# Patient Record
Sex: FEMALE | Race: WHITE | URBAN - METROPOLITAN AREA
[De-identification: names, ages, dates, MRNs, and addresses within clinical notes are randomized per-mention and may not be internally consistent; named-entity substitution may affect disease eponyms.]

---

## 2017-04-25 ENCOUNTER — TRANSFERRED RECORDS (OUTPATIENT)
Dept: HEALTH INFORMATION MANAGEMENT | Facility: CLINIC | Age: 27
End: 2017-04-25

## 2017-05-22 ENCOUNTER — TRANSFERRED RECORDS (OUTPATIENT)
Dept: HEALTH INFORMATION MANAGEMENT | Facility: CLINIC | Age: 27
End: 2017-05-22

## 2017-05-22 LAB
ALBUMIN SERPL-MCNC: 4.5 G/DL (ref 3.5–5.5)
ALP SERPL-CCNC: 76 IU/L (ref 39–117)
ALT SERPL-CCNC: 23 IU/L (ref 0–32)
AST SERPL-CCNC: 26 IU/L (ref 0–40)
BILIRUB SERPL-MCNC: <0.2 MG/DL (ref 0–1.2)
BUN SERPL-MCNC: 11 MG/DL (ref 6–20)
CALCIUM SERPL-MCNC: 9.5 MG/DL (ref 8.7–10.2)
CHLORIDE SERPLBLD-SCNC: 106 MMOL/L (ref 96–106)
CO2 SERPL-SCNC: 18 MMOL/L (ref 18–28)
CREAT SERPL-MCNC: 0.56 MG/DL (ref 0.57–1)
GFR SERPL CREATININE-BSD FRML MDRD: 128 ML/MIN/1.73M2
GLUCOSE SERPL-MCNC: 80 MG/DL (ref 65–99)
POTASSIUM SERPL-SCNC: 3.9 MMOL/L (ref 3.5–5.2)
PROT SERPL-MCNC: 7.2 G/DL (ref 6–8.5)
SODIUM SERPL-SCNC: 142 MMOL/L (ref 134–144)

## 2017-07-20 ENCOUNTER — OFFICE VISIT (OUTPATIENT)
Dept: INTERNAL MEDICINE | Facility: CLINIC | Age: 27
End: 2017-07-20
Payer: MEDICAID

## 2017-07-20 VITALS
TEMPERATURE: 99 F | WEIGHT: 85 LBS | OXYGEN SATURATION: 99 % | HEART RATE: 96 BPM | DIASTOLIC BLOOD PRESSURE: 70 MMHG | SYSTOLIC BLOOD PRESSURE: 110 MMHG | BODY MASS INDEX: 17.17 KG/M2

## 2017-07-20 DIAGNOSIS — J30.9 ALLERGIC RHINITIS, UNSPECIFIED CHRONICITY, UNSPECIFIED SEASONALITY, UNSPECIFIED TRIGGER: ICD-10-CM

## 2017-07-20 DIAGNOSIS — F84.2 RETT'S SYNDROME: ICD-10-CM

## 2017-07-20 DIAGNOSIS — R56.9 CONVULSIONS, UNSPECIFIED CONVULSION TYPE (H): ICD-10-CM

## 2017-07-20 DIAGNOSIS — Z15.1 RETT'S SYNDROME: ICD-10-CM

## 2017-07-20 DIAGNOSIS — H57.9 EYE DISORDER: ICD-10-CM

## 2017-07-20 DIAGNOSIS — Z00.01 ENCOUNTER FOR GENERAL ADULT MEDICAL EXAMINATION WITH ABNORMAL FINDINGS: Primary | ICD-10-CM

## 2017-07-20 PROCEDURE — 99395 PREV VISIT EST AGE 18-39: CPT | Performed by: INTERNAL MEDICINE

## 2017-07-20 RX ORDER — OLOPATADINE HYDROCHLORIDE 1 MG/ML
1 SOLUTION/ DROPS OPHTHALMIC 2 TIMES DAILY
Qty: 10 ML | Refills: 11 | Status: SHIPPED | OUTPATIENT
Start: 2017-07-20

## 2017-07-20 RX ORDER — DIAZEPAM ORAL SOLUTION (CONCENTRATE) 5 MG/ML
SOLUTION ORAL
COMMUNITY
Start: 2017-07-20

## 2017-07-20 RX ORDER — LEVOCARNITINE 1 G/10ML
SOLUTION ORAL
COMMUNITY
Start: 2017-07-20

## 2017-07-20 NOTE — PROGRESS NOTES
SUBJECTIVE:   CC: Kristi Cramer is an 26 year old woman who presents for preventive health visit.     Healthy Habits:    Do you get at least three servings of calcium containing foods daily (dairy, green leafy vegetables, etc.)? yes    Amount of exercise or daily activities, outside of work: N/A    Problems taking medications regularly No    Medication side effects: No    Have you had an eye exam in the past two years? NO    Do you see a dentist twice per year? yes    Do you have sleep apnea, excessive snoring or daytime drowsiness?no        Today's PHQ-2 Score: PHQ-2 ( 1999 Pfizer) 7/20/2017 7/19/2016   Q1: Little interest or pleasure in doing things 0 0   Q2: Feeling down, depressed or hopeless 0 0   PHQ-2 Score 0 0   Q1: Little interest or pleasure in doing things - -   Q2: Feeling down, depressed or hopeless - -   PHQ-2 Score - -         Abuse: Current or Past(Physical, Sexual or Emotional)- No  Do you feel safe in your environment - Yes  Social History   Substance Use Topics     Smoking status: Never Smoker     Smokeless tobacco: Not on file     Alcohol use No     N/A Patient does not drink alcohol    Reviewed orders with patient.  Reviewed health maintenance and updated orders accordingly - Yes  Labs reviewed in EPIC    Mammogram not appropriate for this patient based on age.    Pertinent mammograms are reviewed under the imaging tab.  History of abnormal Pap smear:  No. Not sexually active and not able to do pap/pelvic due to pt cooperation. Would have to be done under full sedation    Reviewed and updated as needed this visit by clinical staff  Tobacco  Allergies         Reviewed and updated as needed this visit by Provider            ROS:  Full review of systems unable to be obtained for the patient both due to her baseline neurologic functional status and also because of patient's current sedation. Patient had a seizure prior to leaving her home with her mother to come to the appointment today.  Patient was treated with Valium as per standard recommendations of her neurologist. Per the patient's mother, patient has seizures once or twice a week which her neurologist is aware of. No acute increase in frequency. These are brief and either did not require additional treatment or respond to Valium single doses prescribed. Mother states the patient's appetite has been stable. She has not been having any fevers or chills recently. No coughing or appearance of shortness of breath. Bowel movements of an regular. Urine frequency is at baseline. No rashes. Patient's mood has appeared happy. Has occ eye erythema and itchng without mattering that responds generally to Patanol drops. Pt doesnt like to swallow pills and has occ sneezing too or clear rhinorrhea and other requests trial of antihistamine. Menses regular    OBJECTIVE:   /70  Pulse 96  Temp 99  F (37.2  C) (Axillary)  Wt 85 lb (38.6 kg)  LMP 07/17/2017 (Exact Date)  SpO2 99%  BMI 17.17 kg/m2  EXAM:  Gen: Sitting in WC. Gait not assessed  Eye: PERRL, EOMI. no current erythema  HENT: ear canals and TM's normal and nose and mouth without ulcers or lesions. Nasal mucosa minimally edematous with some clear nasal discharge. Sinuses nontender   Neck: no adenopathy. Thyroid normal to palpation. No bruits  Pulm: Lungs clear to auscultation   CV: Regular rates and rhythm  GI: Soft, nontender, Normal active bowel sounds, No hepatosplenomegaly or masses palpable  Ext: Peripheral pulses intact. No edema.  Neuro: Normal strength and tone, sensory exam grossly normal to LTS. Pt appears mildly sleepy compared to baseline consistent with recent valium dose      ASSESSMENT/PLAN:   1. Encounter for general adult medical examination with abnormal findings  HCM UTD except pap/pelvic which am not able to do unless under sedation. 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 Will there defer unless sx/findings  "require.  Menses regular per pt's mother. Recent CMP, CBC labs from Neurology normal. No other screening labs needed    2. Eye disorder  Continue eyedrops p.r.n. Currently asymptomatic  - olopatadine (PATANOL) 0.1 % ophthalmic solution; Apply 1 drop to eye 2 times daily as needed for eye irritation  Dispense: 10 mL; Refill: 11    3. Allergic rhinitis, unspecified chronicity, unspecified seasonality, unspecified trigger  Trial of antihistamine therapy as needed for seasonal allergy symptoms  - loratadine (LORATADINE CHILDRENS) 5 MG/5ML syrup; Take 10 mLs (10 mg) by mouth daily  Dispense: 300 mL; Refill: 11    4. Convulsions, unspecified convulsion type (H)  Patient followed by neurology. Recent seizure med levels normal. Continue management per neurology. They are aware of frequency of seizures per pt's mother    5. Rett's syndrome    COUNSELING:   Reviewed preventive health counseling, as reflected in patient instructions         reports that she has never smoked. She does not have any smokeless tobacco history on file.    Estimated body mass index is 17.17 kg/(m^2) as calculated from the following:    Height as of 7/19/16: 4' 11\" (1.499 m).    Weight as of this encounter: 85 lb (38.6 kg).   Weight management plan noted, stable and monitoring    Counseling Resources:  ATP IV Guidelines  Pooled Cohorts Equation Calculator  Breast Cancer Risk Calculator  FRAX Risk Assessment  ICSI Preventive Guidelines  Dietary Guidelines for Americans, 2010  USDA's MyPlate  ASA Prophylaxis  Lung CA Screening    Louis Dale MD  [facility]  "

## 2017-07-20 NOTE — NURSING NOTE
"Chief Complaint   Patient presents with     Physical       Initial /70  Pulse 96  Temp 99  F (37.2  C) (Axillary)  Wt 85 lb (38.6 kg)  LMP 07/17/2017 (Exact Date)  SpO2 99%  BMI 17.17 kg/m2 Estimated body mass index is 17.17 kg/(m^2) as calculated from the following:    Height as of 7/19/16: 4' 11\" (1.499 m).    Weight as of this encounter: 85 lb (38.6 kg).  Medication Reconciliation: complete.  "

## 2017-07-20 NOTE — MR AVS SNAPSHOT
"              After Visit Summary   7/20/2017    Kristi Cramer    MRN: [id number]           Patient Information     Date Of Birth          1990        Visit Information        Provider Department      7/20/2017 11:30 AM Louis Dale MD [facility]        Today's Diagnoses     Encounter for general adult medical examination with abnormal findings    -  1    Eye disorder        Allergic rhinitis, unspecified chronicity, unspecified seasonality, unspecified trigger        Convulsions, unspecified convulsion type (H)        Rett's syndrome           Follow-ups after your visit        Who to contact     If you have questions or need follow up information about today's clinic visit or your schedule please contact [facility] directly at [phone].  Normal or non-critical lab and imaging results will be communicated to you by MyChart, letter or phone within 4 business days after the clinic has received the results. If you do not hear from us within 7 days, please contact the clinic through MyChart or phone. If you have a critical or abnormal lab result, we will notify you by phone as soon as possible.  Submit refill requests through [employer] or call your pharmacy and they will forward the refill request to us. Please allow 3 business days for your refill to be completed.          Additional Information About Your Visit        [facility] Information     [facility]BEP lets you send messages to your doctor, view your test results, renew your prescriptions, schedule appointments and more. To sign up, go to [email or web address] . Click on \"Log in\" on the left side of the screen, which will take you to the Welcome page. Then click on \"Sign up Now\" on the right side of the page.     You will be asked to enter the access code listed below, as well as some personal information. Please follow the directions to create your username and password.     Your access code is: " [email or web address]  Expires: 10/21/2017  6:05 PM     Your access code will  in 90 days. If you need help or a new code, please call your [place] clinic or [phone].        Care EveryWhere ID     This is your Care EveryWhere ID. This could be used by other organizations to access your [place] medical records  [id number]        Your Vitals Were     Pulse Temperature Last Period Pulse Oximetry BMI (Body Mass Index)       96 99  F (37.2  C) (Axillary) 2017 (Exact Date) 99% 17.17 kg/m2        Blood Pressure from Last 3 Encounters:   17 110/70   16 112/76   14 100/62    Weight from Last 3 Encounters:   17 85 lb (38.6 kg)   16 89 lb (40.4 kg)   06/29/15 80 lb 6.4 oz (36.5 kg)              Today, you had the following     No orders found for display         Today's Medication Changes          These changes are accurate as of: 17 11:59 PM.  If you have any questions, ask your nurse or doctor.               Start taking these medicines.        Dose/Directions    loratadine 5 MG/5ML syrup   Commonly known as:  LORATADINE CHILDRENS   Used for:  Allergic rhinitis, unspecified chronicity, unspecified seasonality, unspecified trigger   Started by:  Louis Dale MD        Dose:  10 mg   Take 10 mLs (10 mg) by mouth daily   Quantity:  300 mL   Refills:  11         These medicines have changed or have updated prescriptions.        Dose/Directions    CARNITOR 1 GM/10ML solution   This may have changed:  additional instructions   Generic drug:  levOCARNitine   Changed by:  Louis Dale MD        5ml three times a day   Refills:  0       DIAZEPAM INTENSOL 5 MG/ML (HIGH CONC) solution   This may have changed:  See the new instructions.   Generic drug:  diazepam   Changed by:  Louis Dale MD        4 cc with sz activity   Refills:  0       olopatadine 0.1 % ophthalmic solution   Commonly known as:  PATANOL   This may have changed:  how much to take   Used for:  Eye disorder   Changed by:   Louis Dale MD        Dose:  1 drop   Apply 1 drop to eye 2 times daily as needed for eye irritation   Quantity:  10 mL   Refills:  11            Where to get your medicines      These medications were sent to [facility] [id number] [place] [address] - [address] AT [facility]  [address], [facility] [address]    Hours:  24-hours Phone:  [phone]     loratadine 5 MG/5ML syrup    olopatadine 0.1 % ophthalmic solution                Primary Care Provider Office Phone # Fax #    Louis Dale [phone]       [facility] [address]  [facility] [address]        Equal Access to Services     JESUS TOMAS AH: Hadii rik ku hadasho Soomaali, waaxda luqadaha, qaybta kaalmada adeegyada, waxay idiin haytaiwo ambrose. So [facility] [phone].    ATENCIÓN: Si habla español, tiene a buchanan disposición servicios gratuitos de asistencia lingüística. [facility] [phone].    We comply with applicable federal civil rights laws and [place] laws. We do not discriminate on the basis of race, color, national origin, age, disability sex, sexual orientation or gender identity.            Thank you!     Thank you for choosing [facility]  for your care. Our goal is always to provide you with excellent care. Hearing back from our patients is one way we can continue to improve our services. Please take a few minutes to complete the written survey that you may receive in the mail after your visit with us. Thank you!             Your Updated Medication List - Protect others around you: Learn how to safely use, store and throw away your medicines at www.disposemymeds.org.          This list is accurate as of: 7/20/17 11:59 PM.  Always use your most recent med list.                   Brand Name Dispense Instructions for use Diagnosis    CALCIUM GUMMIES 250-100-500 MG-MG-UNIT Chew   Generic drug:  Calcium-Phosphorus-Vitamin D      Take 1 chew tab by  mouth daily        CARNITOR 1 GM/10ML solution   Generic drug:  levOCARNitine      5ml three times a day        DIAZEPAM INTENSOL 5 MG/ML (HIGH CONC) solution   Generic drug:  diazepam      4 cc with sz activity        laMICtal 25 MG tablet   Generic drug:  lamoTRIgine      Take  by mouth. 4 tablets in the am and 4 tabletsin the pm daily        loratadine 5 MG/5ML syrup    LORATADINE CHILDRENS    300 mL    Take 10 mLs (10 mg) by mouth daily    Allergic rhinitis, unspecified chronicity, unspecified seasonality, unspecified trigger       MIRALAX powder   Generic drug:  polyethylene glycol     1 Bottle    (1/2 capful) in 4 ounces of liquid as needed        Multi-vitamin Tabs tablet   Generic drug:  multivitamin, therapeutic with minerals     30    1 TABLET DAILY        olopatadine 0.1 % ophthalmic solution    PATANOL    10 mL    Apply 1 drop to eye 2 times daily as needed for eye irritation    Eye disorder       order for DME     150 each    5 diapers needed per day    Urinary incontinence, unspecified type       valproic acid 250 MG/5ML Syrp    DEPAKENE     TK 7.5ML PO BID        ZONEGRAN 100 MG capsule   Generic drug:  zonisamide      1 capsul in the am and 2 capsules in the pm daily

## 2017-09-14 ENCOUNTER — TELEPHONE (OUTPATIENT)
Dept: INTERNAL MEDICINE | Facility: CLINIC | Age: 27
End: 2017-09-14

## 2017-09-14 NOTE — TELEPHONE ENCOUNTER
Patient's mother Nat will be turning in a proxy form to renew proxy to her MyChart. For adults, we typically require the patient to be able to sign the form in order to complete the proxy. Mother states patient is unable to sign and has been able to access information in the past without a signature. I've not been able to locate a POA or legal guardianship on file to which mother states she's turned in previously. If the clinic does have information, please update and renew MyChart Proxy.    Central Scheduling  Anthony LÓPEZ

## 2017-09-18 NOTE — TELEPHONE ENCOUNTER
This is a previous Mychart  Proxy from 12/28/11 in media tab. Cannot find a POA document in chart. Demographics says there is a custody documentation abstracted in chart somewhere but that may have been when pt was  Not an adult yet. Would recommend that mother mail us POA/legal guardianship paperwork if needed to get on file. 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

## 2017-09-22 NOTE — TELEPHONE ENCOUNTER
Spoke with patients mother. She states she has previously had access and is not understanding what is going on. When I looked into the patient chart, the mother signed and a HIM CELINE Authorization form to gain proxy access to her daughter chart on 10/3/2012. Does mom simply need to resign this same form?

## 2017-09-28 ENCOUNTER — TELEPHONE (OUTPATIENT)
Dept: INTERNAL MEDICINE | Facility: CLINIC | Age: 27
End: 2017-09-28

## 2017-09-28 NOTE — TELEPHONE ENCOUNTER
Form faxed to [facility] @ [phone].

## 2017-09-28 NOTE — TELEPHONE ENCOUNTER
Rcvd call from Ene Daniel at [facility] looking for MD to sign forms that were sent over  Pulled information from MD's folder and it was given to him and he has been advised this is needed today  Spoke with Ene and advised of the information above  She will call back if not received before end of day  Ene Daniel can be reached at [phone]

## 2017-09-28 NOTE — TELEPHONE ENCOUNTER
Mother requested that I update form so that signed again within 30 days of Kristi's first stay at [facility] and to update that it was OK to break open Zonisamide capsules and  Give in liquid since pt has trouble swallowing capsules.  Form resigned with those changes. Please refax form (fax number on top of form). Form in [place] basket

## 2017-09-29 NOTE — TELEPHONE ENCOUNTER
This is not something the physican needs to be addressing. If there is anything else that is needed for pt's mother  Who is POA to  Access the pt's chart, then nursing or the  staff should be able to take care of this

## 2017-10-03 ENCOUNTER — TELEPHONE (OUTPATIENT)
Dept: INTERNAL MEDICINE | Facility: CLINIC | Age: 27
End: 2017-10-03

## 2017-10-03 DIAGNOSIS — R32 URINARY INCONTINENCE, UNSPECIFIED TYPE: ICD-10-CM

## 2017-10-03 NOTE — TELEPHONE ENCOUNTER
DME: Curly pull-ups include pts' weight and non-sterile gloves; fax to [facility] FAX:[phone] attn: Toya

## 2017-10-11 ENCOUNTER — TELEPHONE (OUTPATIENT)
Dept: INTERNAL MEDICINE | Facility: CLINIC | Age: 27
End: 2017-10-11

## 2017-10-11 DIAGNOSIS — F84.2 RETT'S SYNDROME: ICD-10-CM

## 2017-10-11 DIAGNOSIS — K52.9 CHRONIC DIARRHEA: ICD-10-CM

## 2017-10-11 DIAGNOSIS — Z15.1 RETT'S SYNDROME: ICD-10-CM

## 2017-10-11 DIAGNOSIS — R32 URINARY INCONTINENCE, UNSPECIFIED TYPE: ICD-10-CM

## 2017-10-11 NOTE — TELEPHONE ENCOUNTER
Reason for call:  Order   Order or referral being requested: glove RX  Reason for request: needs a different diagnosis then the one provided  Date needed: {DATE NEEDED:8223 as soon as possible  Has the patient been seen by the PCP for this problem? YES    Additional comments: Patients first rx for gloves did not go through insurance. They would like another rx sent to [facility] for gloves with the diagnosis of frequent diarhea.      Phone number to reach patient:  Other phone number:  [phone]    Best Time:  anytime    Can we leave a detailed message on this number?  NO

## 2017-10-12 NOTE — TELEPHONE ENCOUNTER
Pt was last seen in July. Was not having diarrhea at that time and have not been informed that pt having diarrhea. If having diarrhea issues now, then need to know how long that has been going on, # of stools having per day, etc. If not having chronic diarrhea, then I cannot write rx for  5 pairs gloves/day as requested previously as that would be fraud

## 2017-10-13 NOTE — TELEPHONE ENCOUNTER
Care coordinator called back.  Informed her of Dr. Dale's note.  She will speak with patient's mother regarding the issue and call back with patient's symptoms.

## 2017-10-16 NOTE — TELEPHONE ENCOUNTER
Arlyn care coordinator from [facility] calling back.  Arlyn spoke with patients mother who said the diarrhea isn't frequent but occurs a couple times/week.  This had been a chronic issue for the patient her whole life per patients mothers.

## 2017-10-19 ENCOUNTER — ALLIED HEALTH/NURSE VISIT (OUTPATIENT)
Dept: NURSING | Facility: CLINIC | Age: 27
End: 2017-10-19
Payer: COMMERCIAL

## 2017-10-19 DIAGNOSIS — Z23 NEED FOR PROPHYLACTIC VACCINATION AND INOCULATION AGAINST INFLUENZA: Primary | ICD-10-CM

## 2017-10-19 PROCEDURE — 90686 IIV4 VACC NO PRSV 0.5 ML IM: CPT

## 2017-10-19 PROCEDURE — 90471 IMMUNIZATION ADMIN: CPT

## 2017-10-19 NOTE — TELEPHONE ENCOUNTER
Per mother, pt is having chronic recurrent diarrhea in addition to urinary incontinence. Therefore RX redone to reflect both diagnoses. Rx in [place] basket. Please fax to [facility] and update pt's mother

## 2017-10-19 NOTE — MR AVS SNAPSHOT
"              After Visit Summary   10/19/2017    Kristi Cramer    MRN: [id number]           Patient Information     Date Of Birth          1990        Visit Information        Provider Department      10/19/2017 4:00 PM [facility]        Today's Diagnoses     Need for prophylactic vaccination and inoculation against influenza    -  1       Follow-ups after your visit        Who to contact     If you have questions or need follow up information about today's clinic visit or your schedule please contact [facility] directly at [phone].  Normal or non-critical lab and imaging results will be communicated to you by Rostimahart, letter or phone within 4 business days after the clinic has received the results. If you do not hear from us within 7 days, please contact the clinic through Rostimahart or phone. If you have a critical or abnormal lab result, we will notify you by phone as soon as possible.  Submit refill requests through [facility] [employer] or call your pharmacy and they will forward the refill request to us. Please allow 3 business days for your refill to be completed.          Additional Information About Your Visit        MyChart Information     [facility] lets you send messages to your doctor, view your test results, renew your prescriptions, schedule appointments and more. To sign up, go to [email or web address] . Click on \"Log in\" on the left side of the screen, which will take you to the Welcome page. Then click on \"Sign up Now\" on the right side of the page.     You will be asked to enter the access code listed below, as well as some personal information. Please follow the directions to create your username and password.     Your access code is: [email or web address]  Expires: 10/21/2017  6:05 PM     Your access code will  in 90 days. If you need help or a new code, please call your [facility] or [phone].        Care EveryWhere ID     " This is your Care EveryWhere ID. This could be used by other organizations to access your [place] medical records  [id number]         Blood Pressure from Last 3 Encounters:   07/20/17 110/70   07/19/16 112/76   06/23/14 100/62    Weight from Last 3 Encounters:   07/20/17 85 lb (38.6 kg)   07/19/16 89 lb (40.4 kg)   06/29/15 80 lb 6.4 oz (36.5 kg)              We Performed the Following     FLU VAC, SPLIT VIRUS IM > 3 YO (QUADRIVALENT) [46999]     Vaccine Administration, Initial [44048]        Primary Care Provider Office Phone # Fax #    Louis Dale [phone]       [address] [place] [id number]        Equal Access to Services     JESUS TOMAS : Hadii rik hewitt hadasho Sogladis, waaxda luqadaha, qaybta kaalmada adeegyada, harley kiran . So [facility] [phone].    ATENCIÓN: Si habla español, tiene a buchanan disposición servicios gratuitos de asistencia lingüística. Llame al [phone].    We comply with applicable federal civil rights laws and [place] laws. We do not discriminate on the basis of race, color, national origin, age, disability, sex, sexual orientation, or gender identity.            Thank you!     Thank you for choosing [facility]  for your care. Our goal is always to provide you with excellent care. Hearing back from our patients is one way we can continue to improve our services. Please take a few minutes to complete the written survey that you may receive in the mail after your visit with us. Thank you!             Your Updated Medication List - Protect others around you: Learn how to safely use, store and throw away your medicines at www.disposemymeds.org.          This list is accurate as of: 10/19/17  4:40 PM.  Always use your most recent med list.                   Brand Name Dispense Instructions for use Diagnosis    CALCIUM GUMMIES 250-100-500 MG-MG-UNIT Chew   Generic drug:  Calcium-Phosphorus-Vitamin D      Take 1 chew  tab by mouth daily        CARNITOR 1 GM/10ML solution   Generic drug:  levOCARNitine      5ml three times a day        DIAZEPAM INTENSOL 5 MG/ML (HIGH CONC) solution   Generic drug:  diazepam      4 cc with sz activity        laMICtal 25 MG tablet   Generic drug:  lamoTRIgine      Take  by mouth. 4 tablets in the am and 4 tabletsin the pm daily        loratadine 5 MG/5ML syrup    LORATADINE CHILDRENS    300 mL    Take 10 mLs (10 mg) by mouth daily    Allergic rhinitis, unspecified chronicity, unspecified seasonality, unspecified trigger       MIRALAX powder   Generic drug:  polyethylene glycol     1 Bottle    (1/2 capful) in 4 ounces of liquid as needed        Multi-vitamin Tabs tablet   Generic drug:  multivitamin, therapeutic with minerals     30    1 TABLET DAILY        olopatadine 0.1 % ophthalmic solution    PATANOL    10 mL    Apply 1 drop to eye 2 times daily as needed for eye irritation    Eye disorder       * order for DME     150 each    Curly pull-ups. Uses 5 pull-up diapers  per day Also disp nonsterile gloves. Uses 5 pairs per day    Urinary incontinence, unspecified type       * order for DME     150 each    disp nonsterile gloves. Uses 5 pairs per day    Urinary incontinence, unspecified type, Chronic diarrhea, Rett's syndrome       valproic acid 250 MG/5ML Syrp    DEPAKENE     TK 7.5ML PO BID        ZONEGRAN 100 MG capsule   Generic drug:  zonisamide      1 capsul in the am and 2 capsules in the pm daily        * Notice:  This list has 2 medication(s) that are the same as other medications prescribed for you. Read the directions carefully, and ask your doctor or other care provider to review them with you.

## 2017-10-19 NOTE — TELEPHONE ENCOUNTER
Arlyn calling again asking for glove prescription now to be faxed to her at [facility] Fax # [phone]. Asking for frequent diarrhea diagnosis code (although maybe should be chronic?) and had asked for 5 pairs a day but whatever quantity Dr. Dale would approve is fine.

## 2017-10-19 NOTE — PROGRESS NOTES

## 2017-11-13 ENCOUNTER — TELEPHONE (OUTPATIENT)
Dept: INTERNAL MEDICINE | Facility: CLINIC | Age: 27
End: 2017-11-13

## 2017-11-13 NOTE — TELEPHONE ENCOUNTER
levocarnitine pt needs right away due to health ins change start prior auth: Toya Blackman Care Coordinater tele #[phone]

## 2017-11-15 NOTE — TELEPHONE ENCOUNTER
Ene tele [phone] from [facility]inator would like call back on this

## 2017-11-16 NOTE — TELEPHONE ENCOUNTER
This is a anti-seizure medication that is being prescribed by pt's neurologist. Therefore any prior auth for that med will have to be done by neurology as they will have to document why another  formulary sz med not appropriate. Inform Toya Blackman to speak with pt's neurologist who is prescribing that medication

## 2018-05-01 ENCOUNTER — TRANSFERRED RECORDS (OUTPATIENT)
Dept: HEALTH INFORMATION MANAGEMENT | Facility: CLINIC | Age: 28
End: 2018-05-01

## 2018-08-14 ENCOUNTER — DOCUMENTATION ONLY (OUTPATIENT)
Dept: OTHER | Facility: CLINIC | Age: 28
End: 2018-08-14

## 2018-09-01 ENCOUNTER — HEALTH MAINTENANCE LETTER (OUTPATIENT)
Age: 28
End: 2018-09-01

## 2019-09-30 ENCOUNTER — HEALTH MAINTENANCE LETTER (OUTPATIENT)
Age: 29
End: 2019-09-30

## 2020-01-15 ENCOUNTER — RECORDS - HEALTHEAST (OUTPATIENT)
Dept: ADMINISTRATIVE | Facility: OTHER | Age: 30
End: 2020-01-15

## 2020-01-22 ENCOUNTER — RECORDS - HEALTHEAST (OUTPATIENT)
Dept: ADMINISTRATIVE | Facility: OTHER | Age: 30
End: 2020-01-22

## 2020-01-24 ENCOUNTER — HOME CARE/HOSPICE - HEALTHEAST (OUTPATIENT)
Dept: HOME HEALTH SERVICES | Facility: HOME HEALTH | Age: 30
End: 2020-01-24

## 2020-01-31 ENCOUNTER — RECORDS - HEALTHEAST (OUTPATIENT)
Dept: ADMINISTRATIVE | Facility: OTHER | Age: 30
End: 2020-01-31

## 2020-02-04 ENCOUNTER — HOME CARE/HOSPICE - HEALTHEAST (OUTPATIENT)
Dept: HOME HEALTH SERVICES | Facility: HOME HEALTH | Age: 30
End: 2020-02-04

## 2020-02-04 ENCOUNTER — RECORDS - HEALTHEAST (OUTPATIENT)
Dept: ADMINISTRATIVE | Facility: OTHER | Age: 30
End: 2020-02-04

## 2020-02-11 ENCOUNTER — HOME CARE/HOSPICE - HEALTHEAST (OUTPATIENT)
Dept: HOME HEALTH SERVICES | Facility: HOME HEALTH | Age: 30
End: 2020-02-11

## 2020-02-20 ENCOUNTER — HOME CARE/HOSPICE - HEALTHEAST (OUTPATIENT)
Dept: HOME HEALTH SERVICES | Facility: HOME HEALTH | Age: 30
End: 2020-02-20

## 2020-03-15 ENCOUNTER — HEALTH MAINTENANCE LETTER (OUTPATIENT)
Age: 30
End: 2020-03-15

## 2020-06-24 ENCOUNTER — HOME CARE/HOSPICE - HEALTHEAST (OUTPATIENT)
Dept: HOME HEALTH SERVICES | Facility: HOME HEALTH | Age: 30
End: 2020-06-24

## 2020-06-29 ENCOUNTER — RECORDS - HEALTHEAST (OUTPATIENT)
Dept: ADMINISTRATIVE | Facility: OTHER | Age: 30
End: 2020-06-29

## 2020-07-03 ENCOUNTER — HOME CARE/HOSPICE - HEALTHEAST (OUTPATIENT)
Dept: HOME HEALTH SERVICES | Facility: HOME HEALTH | Age: 30
End: 2020-07-03

## 2020-07-08 ENCOUNTER — HOME CARE/HOSPICE - HEALTHEAST (OUTPATIENT)
Dept: HOME HEALTH SERVICES | Facility: HOME HEALTH | Age: 30
End: 2020-07-08

## 2020-07-08 ENCOUNTER — RECORDS - HEALTHEAST (OUTPATIENT)
Dept: ADMINISTRATIVE | Facility: OTHER | Age: 30
End: 2020-07-08

## 2020-07-10 ENCOUNTER — HOME CARE/HOSPICE - HEALTHEAST (OUTPATIENT)
Dept: HOME HEALTH SERVICES | Facility: HOME HEALTH | Age: 30
End: 2020-07-10

## 2021-01-15 ENCOUNTER — HEALTH MAINTENANCE LETTER (OUTPATIENT)
Age: 31
End: 2021-01-15

## 2021-07-20 VITALS
DIASTOLIC BLOOD PRESSURE: 81 MMHG | OXYGEN SATURATION: 93 % | HEART RATE: 92 BPM | SYSTOLIC BLOOD PRESSURE: 108 MMHG | SYSTOLIC BLOOD PRESSURE: 110 MMHG | TEMPERATURE: 98.4 F | DIASTOLIC BLOOD PRESSURE: 66 MMHG | TEMPERATURE: 99 F

## 2021-07-20 VITALS
TEMPERATURE: 98.6 F | DIASTOLIC BLOOD PRESSURE: 68 MMHG | HEART RATE: 82 BPM | SYSTOLIC BLOOD PRESSURE: 108 MMHG | OXYGEN SATURATION: 97 %

## 2021-10-24 ENCOUNTER — HEALTH MAINTENANCE LETTER (OUTPATIENT)
Age: 31
End: 2021-10-24

## 2022-02-13 ENCOUNTER — HEALTH MAINTENANCE LETTER (OUTPATIENT)
Age: 32
End: 2022-02-13

## 2022-10-15 ENCOUNTER — HEALTH MAINTENANCE LETTER (OUTPATIENT)
Age: 32
End: 2022-10-15

## 2023-03-26 ENCOUNTER — HEALTH MAINTENANCE LETTER (OUTPATIENT)
Age: 33
End: 2023-03-26